# Patient Record
Sex: MALE | Race: WHITE | NOT HISPANIC OR LATINO | Employment: FULL TIME | URBAN - NONMETROPOLITAN AREA
[De-identification: names, ages, dates, MRNs, and addresses within clinical notes are randomized per-mention and may not be internally consistent; named-entity substitution may affect disease eponyms.]

---

## 2024-08-03 ENCOUNTER — HOSPITAL ENCOUNTER (EMERGENCY)
Facility: HOSPITAL | Age: 54
Discharge: HOME | End: 2024-08-03
Attending: STUDENT IN AN ORGANIZED HEALTH CARE EDUCATION/TRAINING PROGRAM
Payer: COMMERCIAL

## 2024-08-03 VITALS
RESPIRATION RATE: 18 BRPM | HEART RATE: 90 BPM | OXYGEN SATURATION: 93 % | BODY MASS INDEX: 37.8 KG/M2 | DIASTOLIC BLOOD PRESSURE: 115 MMHG | SYSTOLIC BLOOD PRESSURE: 162 MMHG | HEIGHT: 71 IN | TEMPERATURE: 97.9 F | WEIGHT: 270 LBS

## 2024-08-03 DIAGNOSIS — I10 HYPERTENSION, UNSPECIFIED TYPE: ICD-10-CM

## 2024-08-03 DIAGNOSIS — R04.0 BLEEDING FROM THE NOSE: Primary | ICD-10-CM

## 2024-08-03 PROCEDURE — 99283 EMERGENCY DEPT VISIT LOW MDM: CPT

## 2024-08-03 RX ORDER — CLONIDINE HYDROCHLORIDE 0.1 MG/1
0.1 TABLET ORAL ONCE
Status: COMPLETED | OUTPATIENT
Start: 2024-08-03 | End: 2024-08-03

## 2024-08-03 RX ADMIN — CLONIDINE HYDROCHLORIDE 0.1 MG: 0.1 TABLET ORAL at 20:11

## 2024-08-03 NOTE — DISCHARGE INSTRUCTIONS
Please follow-up with your PCP regarding hypertension.  Please follow-up with ENT regarding recurrent nosebleeds.

## 2024-08-04 NOTE — ED PROVIDER NOTES
"Subjective:  History of Present Illness:    Patient is a 54-year-old male with recurrent nosebleeds.  Presents to the ER today for nosebleed that started approximately 1 hour PTA.  Patient denies any other symptom.  Reports that he has held some pressure at home.  Did use some Afrin spray at the house.  Upon arrival to the ER nosebleed started to slow.  Once in the ER nosebleed stopped.  Denies dizziness shortness of breath chest pain or any other associated symptom.  Denies OTC medication home remedy.  Denies alleviating or exacerbating factors    Nurses Notes reviewed and agree, including vitals, allergies, social history and prior medical history.     REVIEW OF SYSTEMS: All systems reviewed and not pertinent unless noted.  Review of Systems   HENT:  Positive for nosebleeds.    All other systems reviewed and are negative.      History reviewed. No pertinent past medical history.    Allergies:    Patient has no known allergies.      History reviewed. No pertinent surgical history.      Social History     Socioeconomic History    Marital status:          History reviewed. No pertinent family history.    Objective  Physical Exam:  BP (!) 162/115 (BP Location: Left arm)   Pulse 90   Temp 97.9 °F (36.6 °C) (Oral)   Resp 18   Ht 180.3 cm (71\")   Wt 122 kg (270 lb)   SpO2 93%   BMI 37.66 kg/m²      Physical Exam  Vitals and nursing note reviewed.   Constitutional:       Appearance: Normal appearance. He is normal weight.   HENT:      Head: Normocephalic and atraumatic.      Nose:      Comments: Right nasal turbinate is with blood/blood clots no active bleeding noted     Mouth/Throat:      Mouth: Mucous membranes are moist. Mucous membranes are dry.      Pharynx: Oropharynx is clear.   Eyes:      Extraocular Movements: Extraocular movements intact.      Conjunctiva/sclera: Conjunctivae normal.      Pupils: Pupils are equal, round, and reactive to light.   Cardiovascular:      Rate and Rhythm: Normal rate and " regular rhythm.   Pulmonary:      Effort: Pulmonary effort is normal.   Abdominal:      General: Abdomen is flat. Bowel sounds are normal.   Musculoskeletal:         General: Normal range of motion.      Cervical back: Normal range of motion and neck supple.   Skin:     General: Skin is warm.      Capillary Refill: Capillary refill takes less than 2 seconds.   Neurological:      General: No focal deficit present.      Mental Status: He is alert and oriented to person, place, and time. Mental status is at baseline.   Psychiatric:         Mood and Affect: Mood normal.         Behavior: Behavior normal.         Thought Content: Thought content normal.         Judgment: Judgment normal.         Procedures    ED Course:         Lab Results (last 24 hours)       ** No results found for the last 24 hours. **             No radiology results from the last 24 hrs       MDM      Initial impression of presenting illness: Patient is a 54-year-old male with recurrent nosebleeds.  Presents to the ER today for nosebleed that started approximately 1 hour PTA.  Patient denies any other symptom.  Reports that he has held some pressure at home.  Did use some Afrin spray at the house.  Upon arrival to the ER nosebleed started to slow.  Once in the ER nosebleed stopped.  Denies dizziness shortness of breath chest pain or any other associated symptom.  Denies OTC medication home remedy.  Denies alleviating or exacerbating factors    DDX: includes but is not limited to: Nosebleed, trauma, abrasion, laceration or other    Patient arrives stable with vitals interpreted by myself.     Pertinent features from physical exam: Nasal turbinate on the right side is with blood and small clot.  No active bleeding noted..    Initial diagnostic plan: N/A    Results from initial plan were reviewed and interpreted by me revealing N/A    Diagnostic information from other sources: Chart review    Interventions / Re-evaluation: Vital signs stable  throughout encounter.  Nasal clip added to the patient's nose when he first arrived.  Left in place for 15 minutes.  Observed for approximately 1 hour without any bleeding noted from the nose.  Blood pressure noted to be somewhat elevated here in the ER.  Patient reports that he took his night blood pressure meds late.  Did give 0.1 clonidine to help decrease blood pressure.    Results/clinical rationale were discussed with patient    Consultations/Discussion of results with other physicians: N/A    Disposition plan: Patient is hemodynamically stable nontoxic-appearing appropriate discharge.  Will add referral for ENT.  Will have him follow-up with PCP regarding elevation blood pressure.  Follow-up with ER for new or worse symptoms.  -----        Final diagnoses:   Bleeding from the nose   Hypertension, unspecified type          Alfie Randle, APRN  08/03/24 [id number]